# Patient Record
Sex: MALE | URBAN - METROPOLITAN AREA
[De-identification: names, ages, dates, MRNs, and addresses within clinical notes are randomized per-mention and may not be internally consistent; named-entity substitution may affect disease eponyms.]

---

## 2018-01-03 ENCOUNTER — TELEPHONE (OUTPATIENT)
Dept: FAMILY MEDICINE CLINIC | Facility: CLINIC | Age: 58
End: 2018-01-03

## 2018-01-03 RX ORDER — EZETIMIBE 10 MG/1
10 TABLET ORAL DAILY
Qty: 30 TABLET | Refills: 1 | Status: SHIPPED | OUTPATIENT
Start: 2018-01-03

## 2018-01-03 NOTE — TELEPHONE ENCOUNTER
PT NEEDS EZETIMIDE 10MG TAB AT [facility]. IN PT'S CHART THERE'S A APPROVAL FOR 5-23-17 TO 5-23-18. PT ALSO HAS APPT WITH PRERNA 2-6-18.